# Patient Record
Sex: MALE | Race: WHITE
[De-identification: names, ages, dates, MRNs, and addresses within clinical notes are randomized per-mention and may not be internally consistent; named-entity substitution may affect disease eponyms.]

---

## 2019-11-20 ENCOUNTER — HOSPITAL ENCOUNTER (INPATIENT)
Dept: HOSPITAL 74 - FASUSAT | Age: 74
LOS: 2 days | Discharge: HOME | DRG: 470 | End: 2019-11-22
Attending: NURSE PRACTITIONER | Admitting: INTERNAL MEDICINE
Payer: COMMERCIAL

## 2019-11-20 VITALS — BODY MASS INDEX: 25.7 KG/M2

## 2019-11-20 DIAGNOSIS — E78.5: ICD-10-CM

## 2019-11-20 DIAGNOSIS — M17.12: Primary | ICD-10-CM

## 2019-11-20 DIAGNOSIS — I10: ICD-10-CM

## 2019-11-20 PROCEDURE — 0SRD0JZ REPLACEMENT OF LEFT KNEE JOINT WITH SYNTHETIC SUBSTITUTE, OPEN APPROACH: ICD-10-PCS | Performed by: ORTHOPAEDIC SURGERY

## 2019-11-20 RX ADMIN — OXYCODONE HYDROCHLORIDE SCH MG: 10 TABLET, FILM COATED, EXTENDED RELEASE ORAL at 21:06

## 2019-11-20 RX ADMIN — OXYCODONE HYDROCHLORIDE SCH: 10 TABLET, FILM COATED, EXTENDED RELEASE ORAL at 14:30

## 2019-11-20 RX ADMIN — CEFAZOLIN SODIUM SCH: 2 SOLUTION INTRAVENOUS at 14:30

## 2019-11-20 RX ADMIN — CEFAZOLIN SODIUM SCH MLS/HR: 2 SOLUTION INTRAVENOUS at 16:27

## 2019-11-20 RX ADMIN — DOCUSATE SODIUM,SENNOSIDES SCH TABLET: 50; 8.6 TABLET, FILM COATED ORAL at 21:07

## 2019-11-20 RX ADMIN — ASPIRIN SCH MG: 81 TABLET, COATED ORAL at 21:05

## 2019-11-20 RX ADMIN — ACETAMINOPHEN SCH MG: 325 TABLET ORAL at 18:20

## 2019-11-20 RX ADMIN — ACETAMINOPHEN SCH MG: 325 TABLET ORAL at 12:15

## 2019-11-20 RX ADMIN — ACETAMINOPHEN SCH MG: 325 TABLET ORAL at 23:03

## 2019-11-20 RX ADMIN — CEFAZOLIN SODIUM SCH MLS/HR: 2 SOLUTION INTRAVENOUS at 22:53

## 2019-11-20 RX ADMIN — ATORVASTATIN CALCIUM SCH MG: 10 TABLET, FILM COATED ORAL at 21:05

## 2019-11-20 NOTE — PN
Progress Note (short form)





- Note


Progress Note: 





74M s/p LEFT total knee replacement POD #0.





-Pain control: per anaesthesia team.


-DVT PPx:


   -Chemical: ASA 81mg PO BID x 6 weeks.


   -Mechanical: MARIYA's, SCD's.


-Incentive spirometry q15 min.


-PT/OT/Rehab, OOB.


-WBAT LLE.


-Post-op Ancef x 3 doses.


-f/u post-op TOV: 8 hours max.


-f/u AM labs.


-Diet as tolerated.


-Care per medical hospitalist team.


-Discharge planning: f/u [facility] [place] Office Thursday 11/27/2019; 

call for appointment [phone].


-Will follow.





Ricky Ramirez MD (Orthopaedic Surgery).

## 2019-11-20 NOTE — OP
DATE OF OPERATION:  11/20/2019

 

OPERATION:  Left total knee replacement.  

 

 

MD AMY Martin/[id number]

DD: 11/20/2019 10:29

DT: 11/20/2019 16:04

Job #:  [id number]

## 2019-11-20 NOTE — HP
HISTORY OF PRESENT ILLNESS:


74 year-old male with a PMH significant for HTN, HLD, and left knee DJD s/p 

left total knee arthroplasty with Dr. Ricky Ramirez earlier today.





Recent Travel:


No





PAST MEDICAL HISTORY:


Hypertension


Hyperlipidemia


Degenerative joint disease





PAST SURGICAL HISTORY:


Left inguinal hernia repair





Social History:


Smoking: former, quit 1987


Alcohol: social 


Drugs: no





Allergies


No Known Drug Allergies Allergy (Verified 11/12/19 13:21)


 








HOME MEDICATIONS:


 Home Medications











 Medication  Instructions  Recorded


 


Amlodipine Besylate/Benazepril 1 each PO DAILY 11/12/19





[Lotrel 5-20 mg Capsule]  


 


Aspirin [Ecotrin] 81 mg PO DAILY 11/12/19


 


Simvastatin 10 mg PO HS 11/12/19








REVIEW OF SYSTEMS


CONSTITUTIONAL: 


Absent:  fever, chills, diaphoresis, generalized weakness, malaise, loss of 

appetite, weight change


HEENT: 


Absent:  rhinorrhea, nasal congestion, throat pain, throat swelling, difficulty 

swallowing, mouth swelling, ear pain, eye pain, visual changes


CARDIOVASCULAR: 


Absent: chest pain, syncope, palpitations, irregular heart rate, lightheadedness

, peripheral edema


RESPIRATORY: 


Absent: cough, shortness of breath, dyspnea with exertion, orthopnea, wheezing, 

stridor, hemoptysis


GASTROINTESTINAL:


Absent: abdominal pain, abdominal distension, nausea, vomiting, diarrhea, 

constipation, melena, hematochezia


GENITOURINARY: 


Absent: dysuria, frequency, urgency, hesitancy, hematuria, flank pain, genital 

pain


MUSCULOSKELETAL: 


Absent: myalgia, arthralgia, joint swelling, back pain, neck pain


SKIN: 


Absent: rash, itching, pallor


HEMATOLOGIC/IMMUNOLOGIC: 


Absent: easy bleeding, easy bruising, lymphadenopathy, frequent infections


ENDOCRINE:


Absent: unexplained weight gain, unexplained weight loss, heat intolerance, 

cold intolerance


NEUROLOGIC: 


Absent: headache, focal weakness or paresthesias, dizziness, unsteady gait, 

seizure, mental status changes, bladder or bowel incontinence


PSYCHIATRIC: 


Absent: anxiety, depression, suicidal or homicidal ideation, hallucinations.








PHYSICAL EXAMINATION


 Vital Signs - 24 hr











  11/20/19 11/20/19 11/20/19





  06:34 10:56 11:00


 


Temperature 97.9 F 97.2 F L 


 


Pulse Rate 64 70 69


 


Respiratory 18 16 17





Rate   


 


Blood Pressure 143/74 99/55 L 108/78


 


O2 Sat by Pulse 97 95 97





Oximetry (%)   














  11/20/19 11/20/19 11/20/19





  11:05 11:10 11:25


 


Temperature   


 


Pulse Rate 70 68 68


 


Respiratory 18 20 17





Rate   


 


Blood Pressure 103/54 L 103/54 L 98/53 L


 


O2 Sat by Pulse 97 97 98





Oximetry (%)   














  11/20/19 11/20/19 11/20/19





  11:40 11:55 12:10


 


Temperature   


 


Pulse Rate 66 72 68


 


Respiratory 17 18 20





Rate   


 


Blood Pressure 91/39 L 100/54 L 108/50 L


 


O2 Sat by Pulse 98 96 98





Oximetry (%)   














  11/20/19





  12:27


 


Temperature 97.2 F L


 


Pulse Rate 63


 


Respiratory 20





Rate 


 


Blood Pressure 110/55 L


 


O2 Sat by Pulse 98





Oximetry (%) 











GENERAL: Awake, alert, and fully oriented, in no acute distress.


HEAD: Normal with no signs of trauma.


EYES: Pupils equal, round and reactive to light, extraocular movements intact, 

sclera anicteric, conjunctiva clear. No lid lag.


LUNGS: Breath sounds equal, clear to auscultation bilaterally. No wheezes, and 

no crackles. No accessory muscle use.


HEART: Regular rate and rhythm, normal S1 and S2 


ABDOMEN: Soft, nontender, not distended


UPPER EXTREMITIES: 2+ pulses, warm, well-perfused. No cyanosis. No clubbing. No 

peripheral edema.


LOWER EXTREMITIES: TEDs, SCDs, left leg surgical dressings c/d/i, ice pack, can 

flex/extend toes 


NEUROLOGICAL:  Cranial nerves II-XII intact. Normal speech. 





Pre op


Hgb 13.2


BUN 19, Cr 1.0





Intra op


Cefazolin 2g, vanc 1g


LR 1,700ccs


EBL <100ccs





ASSESSMENT/PLAN:


74 year-old male with a PMH significant for HTN, HLD, and left knee DJD s/p 

left total knee arthroplasty with Dr. Ricky Ramirez earlier today.





Left total knee arthroplasthy


                --POD #0


 --perioperative antibiotics per surgery


 --pain management per surgery


 --ASA 81mg BID


 --protonix 


 --bowel regimen


 --incentive spirometry


 --Hemovac drain, monitor output





Hypertension


   --continue amlodipine, lisinopril





Hyperlipidemia


   --continue atorvastatin








FEN


 Fluids: LR@125mL/hr


 Electrolytes: replete as indicated


 Nutrition: regular diet





DVT prophylaxis: OOB, ambulation, SCDs, TEDs, ASA 81mg BID





Physical therapy





Dispo: continues to require inpatient care. Full code.








Visit type





- Emergency Visit


Emergency Visit: No





- New Patient


This patient is new to me today: Yes


Date on this admission: 11/20/19





- Critical Care


Critical Care patient: No

## 2019-11-20 NOTE — OP
Operative Note





- Note:


Operative Date: 11/20/19


Pre-Operative Diagnosis: L knee DJD


Operation: L knee TKA


Implants: [place]ing Triathlon.  Femur - 5.  Tibia - 6.  Poly - 9mm, PS.  Patella 

- 27mm, symmetric


Post-Operative Diagnosis: Same as Pre-op


Surgeon: Ricky Ramirez


Assistant: Prince Ramirez


Anesthesiologist/CRNA: Deanna Zabala


Anesthesia: Spinal


Specimens Removed: Bone, soft tissue


Estimated Blood Loss (mls): 0


Drains & Tubes with Location: 1 x deep HemoVac


Fluid Volume Replaced (mls): 1,000 (Crystalloid)


Operative Report Dictated: Yes

## 2019-11-21 LAB
ANION GAP SERPL CALC-SCNC: 7 MMOL/L (ref 8–16)
BUN SERPL-MCNC: 17 MG/DL (ref 7–18)
CALCIUM SERPL-MCNC: 8.2 MG/DL (ref 8.5–10)
CHLORIDE SERPL-SCNC: 102 MMOL/L (ref 98–107)
CO2 SERPL-SCNC: 26 MMOL/L (ref 21–32)
CREAT SERPL-MCNC: 0.9 MG/DL (ref 0.55–1.3)
DEPRECATED RDW RBC AUTO: 12.6 % (ref 11.9–15.9)
GLUCOSE SERPL-MCNC: 129 MG/DL (ref 74–106)
HCT VFR BLD CALC: 33.2 % (ref 35.4–49)
HGB BLD-MCNC: 10.9 GM/DL (ref 11.7–16.9)
MAGNESIUM SERPL-MCNC: 1.7 MG/DL (ref 1.8–2.4)
MCH RBC QN AUTO: 29.4 PG (ref 25.7–33.7)
MCHC RBC AUTO-ENTMCNC: 32.9 G/DL (ref 32–35.9)
MCV RBC: 89.3 FL (ref 80–96)
PLATELET # BLD AUTO: 259 K/MM3 (ref 134–434)
PMV BLD: 7.8 FL (ref 7.5–11.1)
POTASSIUM SERPLBLD-SCNC: 4.5 MMOL/L (ref 3.5–5.1)
RBC # BLD AUTO: 3.72 M/MM3 (ref 4–5.6)
SODIUM SERPL-SCNC: 135 MMOL/L (ref 136–145)
WBC # BLD AUTO: 13.2 K/MM3 (ref 4–10.8)

## 2019-11-21 RX ADMIN — ATORVASTATIN CALCIUM SCH MG: 10 TABLET, FILM COATED ORAL at 21:15

## 2019-11-21 RX ADMIN — ACETAMINOPHEN SCH MG: 325 TABLET ORAL at 06:22

## 2019-11-21 RX ADMIN — AMLODIPINE BESYLATE SCH MG: 5 TABLET ORAL at 09:32

## 2019-11-21 RX ADMIN — DOCUSATE SODIUM,SENNOSIDES SCH TABLET: 50; 8.6 TABLET, FILM COATED ORAL at 21:15

## 2019-11-21 RX ADMIN — LISINOPRIL SCH MG: 20 TABLET ORAL at 09:32

## 2019-11-21 RX ADMIN — ASPIRIN SCH MG: 81 TABLET, COATED ORAL at 21:15

## 2019-11-21 RX ADMIN — DOCUSATE SODIUM,SENNOSIDES SCH TABLET: 50; 8.6 TABLET, FILM COATED ORAL at 09:32

## 2019-11-21 RX ADMIN — ACETAMINOPHEN SCH MG: 325 TABLET ORAL at 17:07

## 2019-11-21 RX ADMIN — ASPIRIN SCH MG: 81 TABLET, COATED ORAL at 09:32

## 2019-11-21 RX ADMIN — ACETAMINOPHEN SCH: 325 TABLET ORAL at 12:05

## 2019-11-21 RX ADMIN — ACETAMINOPHEN SCH MG: 325 TABLET ORAL at 23:41

## 2019-11-21 RX ADMIN — PANTOPRAZOLE SODIUM SCH MG: 40 TABLET, DELAYED RELEASE ORAL at 09:32

## 2019-11-21 NOTE — PN
Physical Exam: 


SUBJECTIVE: Patient seen and examined. Ambulating with PT. Feeling better. Pain 

well-managed.








OBJECTIVE:





 Vital Signs











 Period  Temp  Pulse  Resp  BP Sys/Rios  Pulse Ox


 


 Last 24 Hr  97.2 F-98.3 F  63-88  16-20  /39-78  95-99











GENERAL: Awake, alert, and fully oriented, in no acute distress.


HEAD: Normal with no signs of trauma.


EYES: Pupils equal, round and reactive to light, extraocular movements intact, 

sclera anicteric, conjunctiva clear. No lid lag.


LUNGS: Breath sounds equal, clear to auscultation bilaterally. No wheezes, and 

no crackles. No accessory muscle use.


HEART: Regular rate and rhythm, normal S1 and S2 


ABDOMEN: Soft, nontender, not distended


UPPER EXTREMITIES: 2+ pulses, warm, well-perfused. No cyanosis. No clubbing. No 

peripheral edema.


LOWER EXTREMITIES: TEDs, SCDs, left leg surgical dressings c/d/i


NEUROLOGICAL:  Cranial nerves II-XII intact. Normal speech. 

















 Laboratory Results - last 24 hr











  11/21/19 11/21/19





  05:20 05:20


 


WBC  13.2 H 


 


RBC  3.72 L 


 


Hgb  10.9 L 


 


Hct  33.2 L 


 


MCV  89.3 


 


MCH  29.4 


 


MCHC  32.9 


 


RDW  12.6 


 


Plt Count  259 


 


MPV  7.8 


 


Sodium   135 L


 


Potassium   4.5


 


Chloride   102


 


Carbon Dioxide   26


 


Anion Gap   7 L


 


BUN   17.0


 


Creatinine   0.9


 


Est GFR (CKD-EPI)AfAm   97.86


 


Est GFR (CKD-EPI)NonAf   84.43


 


Random Glucose   129 H


 


Calcium   8.2 L


 


Magnesium   1.7 L








                           Active Medications











Generic Name Dose Route Start Last Admin





  Trade Name Freq  PRN Reason Stop Dose Admin


 


Acetaminophen  650 mg  11/20/19 12:00  11/21/19 06:22





  Tylenol -  PO  11/23/19 11:59  650 mg





  Q6H DILSHAD   Administration





     





     





     





     


 


Al Hydroxide/Mg Hydroxide  30 ml  11/20/19 10:26  





  Mylanta Oral Suspension -  PO   





  Q4H PRN   





  DYSPEPSIA   





     





     





     


 


Amlodipine Besylate  5 mg  11/21/19 10:00  





  Norvasc -  PO   





  DAILY DILSHAD   





     





     





     





     


 


Aspirin  81 mg  11/20/19 22:00  11/20/19 21:05





  Ecotrin -  PO   81 mg





  BID DILSHAD   Administration





     





     





     





     


 


Atorvastatin Calcium  10 mg  11/20/19 22:00  11/20/19 21:05





  Lipitor -  PO   10 mg





  HS DILSHAD   Administration





     





     





     





     


 


Lisinopril  20 mg  11/21/19 10:00  





  Prinivil  PO   





  DAILY DILSHAD   





     





     





     





     


 


Magnesium Hydroxide  30 ml  11/20/19 10:26  





  Milk Of Magnesia -  PO   





  PRN PRN   





  CONSTIPATION   





     





     





     


 


Ondansetron HCl  4 mg  11/20/19 10:26  





  Zofran Injection  IVPUSH   





  Q6H PRN   





  NAUSEA   





     





     





     


 


Oxycodone HCl  5 mg  11/20/19 08:22  11/20/19 22:53





  Roxicodone -  PO   5 mg





  Q3H PRN   Administration





  PAIN LEVEL 1-5   





     





     





     


 


Oxycodone HCl  10 mg  11/20/19 08:22  11/21/19 04:17





  Roxicodone -  PO   10 mg





  Q3H PRN   Administration





  PAIN LEVEL 6-10   





     





     





     


 


Pantoprazole Sodium  40 mg  11/21/19 10:00  





  Protonix -  PO   





  DAILY DILSHAD   





     





     





     





     


 


Senna/Docusate Sodium  2 tablet  11/20/19 22:00  11/20/19 21:07





  Pericolace -  PO   2 tablet





  BID DILSHAD   Administration





     





     





     





     











Pre op


Hgb 13.2


BUN 19, Cr 1.0





Intra op


Cefazolin 2g, vanc 1g


LR 1,700ccs


EBL <100ccs





ASSESSMENT/PLAN:


74 year-old male with a PMH significant for HTN, HLD, and left knee DJD s/p 

left total knee arthroplasty with Dr. Ricky Ramirez earlier today.





Left total knee arthroplasthy


                --POD #1


 --perioperative antibiotics complete


 --pain management per surgery


 --ASA 81mg BID


 --protonix 


 --bowel regimen


 --incentive spirometry


 --Hemovac drain, monitor output





Hypertension


   --continue amlodipine, lisinopril





Hyperlipidemia


   --continue atorvastatin








FEN


 Fluids: PO intake adequate


 Electrolytes: replete as indicated


 Nutrition: regular diet





DVT prophylaxis: OOB, ambulation, SCDs, TEDs, ASA 81mg BID





Physical therapy





Dispo: continues to require inpatient care. Full code.











Visit type





- Emergency Visit


Emergency Visit: No





- New Patient


This patient is new to me today: No





- Critical Care


Critical Care patient: No

## 2019-11-21 NOTE — PN
Progress Note (short form)





- Note


Progress Note: 





ORTHOPAEDIC SURGERY





POD #1 s/p s/p Left total knee arthroplasty





No acute events since surgery per RN notes.


Currently sitting in chair at bedside, legs elevated, rolled towel under heels, 

ice pack in place.


C/o incisional pain. Adequate pain control with medications ordered.


Voiding spontaneoulsy.


Denies n/v/f/c, CP, palpitations, SOB or MEDINA.





 Last Vital Signs











Temp Pulse Resp BP Pulse Ox


 


 98.1 F   72   18   153/70   99 


 


 11/21/19 06:00  11/21/19 06:00  11/21/19 06:00  11/21/19 06:00  11/21/19 06:00








 Hemeovac











  11/20/19 11/20/19 11/21/19





  18:34 22:00 06:00


 


Drain 180 170 175








PE


Gen: alert. nad.


LE: dressing c/d/i. Drain on suction (sanguinous). SCDs bilat. LE compartments 

soft, NT bilat. Palpable DP & PT. Dorsi/plantar felxion 5/5





 











Problem List





- Problems


(1) Status post total left knee replacement using cement


Assessment/Plan: 


POD #1 s/p Lt TKR





GOALS FOR TODAY





1. Pain control


2. DVT PPX --> ASA 81 mg PO BID x 6 weeks; Mechanical via TEDs & SCDs


3. Incentive spirometer


4. OOB with PT


5. WBAT LLE


6. Cont to monitor & record drain output q shift


7. Discharge planning to home 11/22/19


8. Will cont to follow with [facility] Hospitalist through discharge 


Above plan discussed with Dr. Prince Ramirez and agree


Code(s): Z96.652 - PRESENCE OF LEFT ARTIFICIAL KNEE JOINT   





(2) HTN (hypertension)


Code(s): I10 - ESSENTIAL (PRIMARY) HYPERTENSION   





(3) HLD (hyperlipidemia)


Code(s): E78.5 - HYPERLIPIDEMIA, UNSPECIFIED

## 2019-11-21 NOTE — PN
Progress Note (short form)





- Note


Progress Note: 





74M M POD1 s/p L TKR under spinal anesthetic with peripheral nerve blocks for 

post operative pain relief.


Pt states that pain is well controlled and denies any anesthetic complications.


AVSS.


Continue current regimen.

## 2019-11-22 VITALS — DIASTOLIC BLOOD PRESSURE: 59 MMHG | HEART RATE: 70 BPM | SYSTOLIC BLOOD PRESSURE: 123 MMHG | TEMPERATURE: 97.9 F

## 2019-11-22 LAB
DEPRECATED RDW RBC AUTO: 13 % (ref 11.9–15.9)
HCT VFR BLD CALC: 34.1 % (ref 35.4–49)
HGB BLD-MCNC: 11.1 GM/DL (ref 11.7–16.9)
MCH RBC QN AUTO: 29.6 PG (ref 25.7–33.7)
MCHC RBC AUTO-ENTMCNC: 32.6 G/DL (ref 32–35.9)
MCV RBC: 90.8 FL (ref 80–96)
PLATELET # BLD AUTO: 267 K/MM3 (ref 134–434)
PMV BLD: 7.9 FL (ref 7.5–11.1)
RBC # BLD AUTO: 3.76 M/MM3 (ref 4–5.6)
WBC # BLD AUTO: 15.1 K/MM3 (ref 4–10.8)

## 2019-11-22 RX ADMIN — ACETAMINOPHEN SCH MG: 325 TABLET ORAL at 06:37

## 2019-11-22 RX ADMIN — AMLODIPINE BESYLATE SCH MG: 5 TABLET ORAL at 10:10

## 2019-11-22 RX ADMIN — DOCUSATE SODIUM,SENNOSIDES SCH TABLET: 50; 8.6 TABLET, FILM COATED ORAL at 10:10

## 2019-11-22 RX ADMIN — ASPIRIN SCH MG: 81 TABLET, COATED ORAL at 10:10

## 2019-11-22 RX ADMIN — PANTOPRAZOLE SODIUM SCH MG: 40 TABLET, DELAYED RELEASE ORAL at 10:10

## 2019-11-22 RX ADMIN — LISINOPRIL SCH MG: 20 TABLET ORAL at 10:10

## 2019-11-22 RX ADMIN — ACETAMINOPHEN SCH: 325 TABLET ORAL at 11:49

## 2019-11-22 NOTE — PN
Progress Note (short form)





- Note


Progress Note: 





ORTHOPAEDIC SURGERY





POD #2 s/p s/p Left total knee arthroplasty





No acute events since surgery per RN notes.


Currently sitting in chair at bedside, legs elevated, rolled towel under heels, 

ice pack in place.


PT notes reviewed and doing well.


Voiding spontaneoulsy.


Denies n/v/f/c, CP, palpitations, SOB or MEDINA.





 Last Vital Signs











Temp Pulse Resp BP Pulse Ox


 


 98.2 F   75   18   125/56 L  95 


 


 11/22/19 06:00  11/22/19 06:00  11/22/19 06:00  11/22/19 06:00  11/22/19 06:00














PE


Gen: alert. nad.


LE: dressing c/d/i. Drain dc'd while on rounds. SCDs bilat. LE compartments soft

, NT bilat. Palpable DP & PT. 


     Knee flexion to 90 degrees. Dorsi/plantar felxion 5/5


 





Problem List





- Problems


(1) Status post total left knee replacement using cement


Assessment/Plan: 


POD #2 s/p Lt TKR





GOALS FOR TODAY





1. Pain control


2. DVT PPX --> ASA 81 mg PO BID x 6 weeks; Mechanical via TEDs & SCDs


3. Incentive spirometer


4. OOB with PT


5. WBAT LLE


6. Discharge home after PT today


7. F/u [facility] detailed in DC PLAN tab.


Code(s): Z96.652 - PRESENCE OF LEFT ARTIFICIAL KNEE JOINT   





(2) HTN (hypertension)


Code(s): I10 - ESSENTIAL (PRIMARY) HYPERTENSION   





(3) HLD (hyperlipidemia)


Code(s): E78.5 - HYPERLIPIDEMIA, UNSPECIFIED

## 2019-11-22 NOTE — PATH
Surgical Pathology Report



Patient Name:  ROMAINE POP

Accession #:  [id number]

Med. Rec. #:  [id number]                                                        

   /Age/Gender:  1945 (Age: 74) / M

Account:  [id number]                                                          

             Location: [facility]

Taken:  2019

Received:  2019

Reported:  2019

Physicians:  Ricky Ramirez M.D.

  



Specimen(s) Received

 LEFT KNEE BONES 





Clinical History

Left knee osteoarthritis







Final Diagnosis

KNEE BONES, LEFT, TOTAL KNEE REPLACEMENT:

DEGENERATIVE JOINT DISEASE.





***Electronically Signed***

Berenice Ortega M.D.





Gross Description

Received in formalin labeled "left knee bones," is a 10.5 x 9.5 x 2.5 cm

aggregate of multiple portions of bone and soft tissue. The tibial plateau

measures 8.3 x 5.8 x 1.7 cm. There are multiple areas of eburnation present,

measuring up to 4.4 cm in greatest dimension. The remaining articular surfaces

are tan-yellow and focally granular. The underlying trabecular bone is yellow

and hard. Representative sections are submitted in one cassette, following

decalcification.

## 2019-11-22 NOTE — DS
Physical Exam: 


SUBJECTIVE: Patient seen and examined








OBJECTIVE:





 Vital Signs











 Period  Temp  Pulse  Resp  BP Sys/Rios  Pulse Ox


 


 Last 24 Hr  97.8 F-99.7 F  72-82  12-18  119-142/56-79  94-95








PHYSICAL EXAM





GENERAL: The patient is awake, alert, and fully oriented, in no acute distress.


HEAD: Normal with no signs of trauma.


EYES: PERRL, extraocular movements intact, sclera anicteric, conjunctiva clear. 


ENT: Ears normal, nares patent, oropharynx clear without exudates, moist mucous 

membranes.


NECK: Trachea midline, full range of motion, supple. 


LUNGS: Breath sounds equal, clear to auscultation bilaterally, no wheezes, no 

crackles, no accessory muscle use. 


HEART: Regular rate and rhythm, S1, S2 without murmur, rub or gallop.


ABDOMEN: Soft, nontender, nondistended, normoactive bowel sounds, no guarding, 

no rebound, no hepatosplenomegaly, no masses.


EXTREMITIES: 2+ pulses, warm, well-perfused, no edema. 


NEUROLOGICAL: Cranial nerves II through XII grossly intact. Normal speech, gait 

not observed.


PSYCH: Normal mood, normal affect.


SKIN: Warm, dry, normal turgor, no rashes or lesions noted.





LABS


 Laboratory Results - last 24 hr











  11/22/19





  07:00


 


WBC  15.1 H


 


RBC  3.76 L


 


Hgb  11.1 L


 


Hct  34.1 L


 


MCV  90.8


 


MCH  29.6


 


MCHC  32.6


 


RDW  13.0


 


Plt Count  267


 


MPV  7.9











HOSPITAL COURSE:





Date of Admission:11/21/19





Date of Discharge: 11/22/19








This report was requested by: Edelmira Nguyen | Reference #: [id number] 


There are no results for the search terms that you entered.


Minutes to complete discharge: 35





Discharge Summary


Reason For Visit: LEFT KNEE OSTEOARTHRITIS


Current Active Problems





HLD (hyperlipidemia) (Acute)


HTN (hypertension) (Acute)


Status post total left knee replacement using cement (Acute)








Condition: Stable





- Instructions


Diet, Activity, Other Instructions: 


Dr. Ramirez Discharge Instructions for Knee Replacement





Post Operative Instructions





Physical activity


Physical Therapist will come to your home for the first 5 days. You will be set 

up with outpatient PT at your first post-operative visit. Use assistive devices 

for ambulation at all times. Weight bearing as tolerated on your surgical side. 

Do not put pillow under knee. May put pillow under heel.





Wound care


Leave your surgical dressing in place. Do not change the dressing until seen by 

your surgeon in the office. No baths or showers. Do not submerge your incision. 

Do not apply any ointments or lotions to your incision. Please call the office 

if your dressing is soiled/dirty or is falling off. Apply Graduated Compression 

Stockings (TEDS) to both lower extremities - remove daily for hygiene ONLY. 





Diet


There are no dietary restrictions. Eat healthy, high-fiber foods. Drink 6 to 8 

glasses of liquid each day. This will assist in keeping your bowels are regular.








Pain management


Any pain prescription medication ordered should be taken as prescribed for 

moderate to severe pain. Do not take additional Tylenol while taking Percocet.





Take Aspirin 81 mg two times a day for a total of 6 weeks to prevent blood 

clots.





Call Dr. Ramirez for any of the following:


Severe pain not relieved by medication


Fever of 101 or higher


Excessive bleeding or drainage on dressing


Inability to urinate


If you experience chest pain or shortness of breath, please seek emergency care 

immediately.





Please call the office at [phone] to confirm your post-op appointment 

for the week following surgery.





iSTOP: This report was requested by: Fred Navarrete | Reference #: [id number]








- Home Medications


Comprehensive Discharge Medication List: 


Ambulatory Orders





Amlodipine Besylate/Benazepril [Lotrel 5-20 mg Capsule] 1 each PO DAILY 11/12/ 19 


Aspirin [Ecotrin] 81 mg PO DAILY 11/12/19 


Simvastatin 10 mg PO HS 11/12/19 








This patient is new to me today: No


Emergency Visit: No


Critical Care patient: No





- Discharge Referral


Referred to [facility] No